# Patient Record
Sex: MALE | Race: ASIAN
[De-identification: names, ages, dates, MRNs, and addresses within clinical notes are randomized per-mention and may not be internally consistent; named-entity substitution may affect disease eponyms.]

---

## 2018-08-27 ENCOUNTER — HOSPITAL ENCOUNTER (OUTPATIENT)
Dept: HOSPITAL 92 - SCSULT | Age: 58
Discharge: HOME | End: 2018-08-27
Attending: FAMILY MEDICINE
Payer: COMMERCIAL

## 2018-08-27 DIAGNOSIS — R10.11: Primary | ICD-10-CM

## 2018-08-27 PROCEDURE — 76700 US EXAM ABDOM COMPLETE: CPT

## 2018-08-27 NOTE — ULT
ULTRASOUND ABDOMEN COMPLETE

 

HISTORY: 

Right upper quadrant pain.

 

TECHNIQUE:

Gray-scale ultrasound evaluation of the liver, gallbladder, spleen, pancreas, common bile duct, kidne
ys, abdominal aorta, and inferior vena cava (IVC).

 

FINDINGS: 

Within the hepatic parenchyma, there is a complex focus of predominantly decreased echogenicity with 
a component of peripherally located irregular increased echogenicity.  There is through transmission 
of sound.  No significant, internal flow is demonstrated.  The finding measures slightly greater than
 2 cm in diameter, as demonstrated.

 

There is no acute gallbladder pathology.  No hydronephrosis of the kidneys.  Portions of the pancreas
 are obscured from view limiting assessment.  No acute splenic pathology.  No ascites or additional s
ignificant intraabdominal pathology.

 

IMPRESSION: 

Findings suggestive of a complex hepatic cyst.  Followup pre- and post-contrast CT of abdomen utilizi
ng hemangioma protocol is recommended to further characterize.

 

CODE T

 

POS: [facility]

## 2018-09-14 ENCOUNTER — HOSPITAL ENCOUNTER (OUTPATIENT)
Dept: HOSPITAL 92 - SCSCT | Age: 58
Discharge: HOME | End: 2018-09-14
Attending: FAMILY MEDICINE
Payer: COMMERCIAL

## 2018-09-14 DIAGNOSIS — K76.89: ICD-10-CM

## 2018-09-14 DIAGNOSIS — R93.8: Primary | ICD-10-CM

## 2018-09-14 PROCEDURE — 74170 CT ABD WO CNTRST FLWD CNTRST: CPT

## 2018-09-14 NOTE — CT
CT ABDOMEN WITH AND WITHOUT IV CONTRAST HEMANGIOMA LIVER PROTOCOL:

 

HISTORY: 

Liver mass.  Abnormal sonogram.  Right upper quadrant pain.

 

COMPARISON: 

Sonogram from 8/27/18.

 

FINDINGS: 

Lung bases are clear.  Centrally within the anterior segment right liver lobe, a well-circumscribed c
yst measuring up to 2.1 cm correlates with the sonographic abnormality.  A tiny adjacent cyst abuts t
his larger cyst.  The larger cyst also slightly displaces adjacent hepatic veins.  These factors like
ly account for the complex appearance on sonogram.  No solid enhancing masses or other complicating f
actors are apparent on CT.  Smaller cysts are present throughout the liver.  No solid masses.

 

Other visualized abdominal organs are unremarkable.  No evidence of bowel obstruction.  No free air o
r free fluid.  The pelvis was not imaged.  Degenerative changes lumbar spine.

 

IMPRESSION: 

Hepatic cysts as detailed above.  No solid masses or otherwise aggressive lesions are apparent.  

 

POS: [facility]